# Patient Record
Sex: FEMALE | Race: WHITE
[De-identification: names, ages, dates, MRNs, and addresses within clinical notes are randomized per-mention and may not be internally consistent; named-entity substitution may affect disease eponyms.]

---

## 2018-06-10 ENCOUNTER — HOSPITAL ENCOUNTER (EMERGENCY)
Dept: HOSPITAL 17 - NEPD | Age: 43
Discharge: HOME | End: 2018-06-10
Payer: COMMERCIAL

## 2018-06-10 VITALS
SYSTOLIC BLOOD PRESSURE: 124 MMHG | HEART RATE: 82 BPM | TEMPERATURE: 98.4 F | DIASTOLIC BLOOD PRESSURE: 76 MMHG | OXYGEN SATURATION: 98 % | RESPIRATION RATE: 16 BRPM

## 2018-06-10 VITALS — WEIGHT: 149.91 LBS | HEIGHT: 64 IN | BODY MASS INDEX: 25.59 KG/M2

## 2018-06-10 DIAGNOSIS — J45.909: ICD-10-CM

## 2018-06-10 DIAGNOSIS — Z88.0: ICD-10-CM

## 2018-06-10 DIAGNOSIS — F10.129: Primary | ICD-10-CM

## 2018-06-10 DIAGNOSIS — Z72.0: ICD-10-CM

## 2018-06-10 PROCEDURE — 99282 EMERGENCY DEPT VISIT SF MDM: CPT

## 2018-06-10 NOTE — PD
HPI


.


Intoxication she presents today for


Chief Complaint:  Alcohol/Drug Intoxication


Time Seen by Provider:  22:03


Travel History


International Travel<30 days:  No


Contact w/Intl Traveler<30days:  No


Traveled to known affect area:  No





History of Present Illness


HPI


This patient presents to us as a [place] Act.  The patient reports that she is 

here on vacation from [place].  The police were called to the hotel for a 

disturbance.  They found her to be markedly intoxicated and unsteady on her 

feet.  The  reports that he "could not in good conscience leave 

her there."





[facility]


Past Medical History


Asthma:  Yes


Immunizations Current:  Yes


Pancreatitis:  Yes


Tetanus Vaccination:  Unknown


Influenza Vaccination:  No


Pregnant?:  Unknown





Social History


Alcohol Use:  Yes (everyday)


Tobacco Use:  Yes


Substance Use:  No





Allergies-Medications


(Allergen,Severity, Reaction):  


Coded Allergies:  


     penicillin G (Verified  Allergy, Severe, Anaphylaxis, 6/10/18)


Reported Meds & Prescriptions





Reported Meds & Active Scripts


Active


No Active Prescriptions or Reported Medications    








Review of Systems


ROS Limitations:  Intoxication





Physical Exam


Narrative


GENERAL: Very intoxicated.  She is a little bit unsteady on her feet.


SKIN:  warm/dry.


HEAD: Normocephalic.  Atraumatic.


EYES: Pupils equal and round.  Extraocular movements are intact.


ENT:  Mucous membranes pink and moist.


NECK: Supple.  Full range of motion without pain.. 


CARDIOVASCULAR: Regular rate and rhythm.  


RESPIRATORY: No accessory muscle use. Clear to auscultation. Breath sounds 

equal bilaterally. 


GASTROINTESTINAL: Abdomen soft.  Nontender.  Bowel sounds present.  

Nondistended.


MUSCULOSKELETAL: No obvious deformities.  Normal muscle tone.


NEUROLOGICAL: Awake and alert. No obvious cranial nerve deficits.  Motor 

grossly within normal limits. Normal speech.


PSYCHIATRIC: Unable to assess.  The patient is very intoxicated.





Data


Data


Last Documented VS





Vital Signs








  Date Time  Temp Pulse Resp B/P (MAP) Pulse Ox O2 Delivery O2 Flow Rate FiO2


 


6/10/18 22:04 98.4 82 16 124/76 (92) 98   











MDM


Medical Decision Making


Medical Screen Exam Complete:  Yes


Emergency Medical Condition:  Yes


Differential Diagnosis


Differential diagnosis of altered mental status includes but is not limited to 

infection, electrolyte abnormality, neurological event, intoxication, 

encephalitis, meningitis


Narrative Course


This patient is brought to us as a [place] Act because of alcohol 

intoxication.  The police report that they were called to her hotel because of 

the disturbance.  They found her to be intoxicated and unsteady on her feet.  

They subsequently brought her here.





This patient is medically clear for discharge when she is either sober or has 

someone else who is sober to take her home.





Diagnosis





 Primary Impression:  


 Acute alcohol intoxication


 Qualified Codes:  F10.929 - Alcohol use, unspecified with intoxication, 

unspecified


Patient Instructions:  Alcohol Intoxication (DC), General Instructions


Scripts


No Active Prescriptions or Reported Meds


Disposition:  01 DISCHARGE HOME


Condition:  Stable











Ximena Enriquez MD Fadi 10, 2018 22:19